# Patient Record
Sex: FEMALE
[De-identification: names, ages, dates, MRNs, and addresses within clinical notes are randomized per-mention and may not be internally consistent; named-entity substitution may affect disease eponyms.]

---

## 2024-02-12 ENCOUNTER — DOCUMENTATION (OUTPATIENT)
Dept: MEDICAL GROUP | Facility: CLINIC | Age: 84
End: 2024-02-12

## 2024-02-12 DIAGNOSIS — N90.7 LABIAL CYST: ICD-10-CM

## 2024-02-12 PROBLEM — I10 PRIMARY HYPERTENSION: Status: ACTIVE | Noted: 2024-02-12

## 2024-02-12 PROBLEM — G60.0 CHARCOT-MARIE DISEASE: Status: ACTIVE | Noted: 2024-02-12

## 2024-02-12 PROBLEM — E03.9 HYPOTHYROID: Status: ACTIVE | Noted: 2024-02-12

## 2024-02-12 PROBLEM — I82.90 BLOOD CLOT IN VEIN: Status: ACTIVE | Noted: 2024-02-12

## 2024-02-12 PROBLEM — Z87.42: Status: ACTIVE | Noted: 2024-02-12

## 2024-02-12 PROBLEM — K21.00 GASTROESOPHAGEAL REFLUX DISEASE WITH ESOPHAGITIS: Status: ACTIVE | Noted: 2024-02-12

## 2024-02-12 PROBLEM — K22.4 ESOPHAGEAL SPASM: Status: ACTIVE | Noted: 2024-02-12

## 2024-02-12 PROBLEM — N80.9 ENDOMETRIOSIS: Status: ACTIVE | Noted: 2024-02-12

## 2024-02-12 PROBLEM — E78.5 DYSLIPIDEMIA: Status: ACTIVE | Noted: 2024-02-12

## 2024-02-12 RX ORDER — LEVOTHYROXINE SODIUM 0.05 MG/1
50 TABLET ORAL
COMMUNITY

## 2024-02-12 RX ORDER — METOPROLOL TARTRATE 50 MG/1
50 TABLET, FILM COATED ORAL 2 TIMES DAILY
COMMUNITY

## 2024-02-12 RX ORDER — RABEPRAZOLE SODIUM 20 MG/1
20 TABLET, DELAYED RELEASE ORAL DAILY
COMMUNITY

## 2024-02-12 RX ORDER — CALCIPOTRIENE, BETAMETHASONE DIPROPIONATE 50; .643 UG/G; MG/G
0.5 OINTMENT TOPICAL DAILY
Qty: 60 G | Refills: 1 | Status: SHIPPED | OUTPATIENT
Start: 2024-02-12

## 2024-02-12 RX ORDER — UMECLIDINIUM BROMIDE AND VILANTEROL TRIFENATATE 62.5; 25 UG/1; UG/1
1 POWDER RESPIRATORY (INHALATION) DAILY
COMMUNITY

## 2024-02-12 RX ORDER — BACLOFEN 10 MG/1
10 TABLET ORAL 3 TIMES DAILY
COMMUNITY

## 2024-02-12 RX ORDER — LOSARTAN POTASSIUM AND HYDROCHLOROTHIAZIDE 25; 100 MG/1; MG/1
1 TABLET ORAL DAILY
COMMUNITY

## 2024-02-12 RX ORDER — FENOFIBRATE 48 MG/1
48 TABLET, COATED ORAL DAILY
COMMUNITY

## 2024-02-12 RX ORDER — ALIROCUMAB 150 MG/ML
INJECTION, SOLUTION SUBCUTANEOUS
COMMUNITY

## 2024-02-12 RX ORDER — CLOTRIMAZOLE AND BETAMETHASONE DIPROPIONATE 10; .64 MG/G; MG/G
1 CREAM TOPICAL 2 TIMES DAILY
Qty: 30 G | Refills: 1 | Status: SHIPPED | OUTPATIENT
Start: 2024-02-12

## 2024-02-12 NOTE — PROGRESS NOTES
CC: Vaginal lesions    HISTORY OF PRESENT ILLNESS: Patient is a 83 y.o. female patient with PMH significant for visual hallucinations, Charcot Tashia tooth, and MVP, HDL and hypertension who presents today for the following:    Vaginal lesions  Patient has been having vaginal lesions since 1959. Lesions have been increasing in frequency over the last two years, approximately every one and a half months. Lesion are described as painful as they begin to swell and then rupture on her labia majora. After they rupture they are no longer painful, process take about a week.  Patient reports using her 's taclonex ointment.  Patient also reports recent history of small hard lesions on her labia minora which are not painful.  Denies any lesions inside the vagina.  Denies any discharge.  Denies shaving pubic hair.    Patient reports she had blood work after her knee replacement and was told she had a virus but she never followed up with it.  PCP has tested her for herpes which was negative.  Patient reports being in a monogamous relationship with her  which is the only sexual partner she has ever had.  History of hysterectomy.   does not have similar lesions.    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    Current Outpatient Medications   Medication Sig Dispense Refill    levothyroxine (SYNTHROID) 50 MCG Tab Take 50 mcg by mouth every morning on an empty stomach.      rabeprazole (ACIPHEX) 20 MG tablet Take 20 mg by mouth every day.      fenofibrate (TRICOR) 48 MG Tab Take 48 mg by mouth every day.      Alirocumab  (PRALUENT) 150 MG/ML Solution Auto-injector Inject  under the skin.      metoprolol tartrate (LOPRESSOR) 50 MG Tab Take 50 mg by mouth 2 times a day.      losartan-hydrochlorothiazide (HYZAAR) 100-25 MG per tablet Take 1 Tablet by mouth every day.      baclofen (LIORESAL) 10 MG Tab Take 10 mg by mouth 3 times a day.      umeclidinium-vilanterol (ANORO ELLIPTA) 62.5-25 MCG/ACT AEROSOL POWDER, BREATH ACTIVATED inhaler Inhale 1 Puff every day.      ipratropium (ATROVENT) 0.02 % Solution Take 0.2 mg by nebulization.      clotrimazole-betamethasone (LOTRISONE) 1-0.05 % Cream Apply 1 Application topically 2 times a day. 30 g 1     No current facility-administered medications for this visit.        Social History     Social History Narrative    Not on file     No family history on file.    Exam:    Wt. 150, Ht. 67 in, /72, HR 86, SpO1 97%    General:  Well nourished, well developed female in NAD  HENT: Atraumatic, normocephalic  EYES: Extraocular movements intact, pupils equal and reactive to light  NECK: Supple, FROM  CHEST: No deformities, Equal chest expansion  RESP: Unlabored, no stridor or audible wheeze  ABD: Non-Distended  : On right labia majora 0.5 x 0.25 cm cystic lesion with hard mobile mass with surrounding area of fluctuance no pain to palpation and no drainage or discharge, multiple white hard nonmobile masses on labia minora measuring approximately 5 mm, no pain with palpation, drainage or discharge.  Extremities: No Clubbing, Cyanosis, or Edema  Skin: Warm/dry, without rashes  Neuro: A/O x 4, CN 2-12 Grossly intact, Motor/sensory grossly intact  Psych: Normal behavior, normal affect    LABS: Results reviewed and discussed with the patient, questions answered.    ASSESSMENT/PLAN:    Labial Cyst  60+ year history, increasing in frequency now occurring every month and a half and lasting for about a week.  Painful until it ruptures.  Appears to involve the follicle.  Taclonex ointment helped relieve  symptoms and prevent rupture.  Patient in monogamous relationship with only 1 sexual partner, low risk for STI.  Herpes test negative.  Labial cyst appears to be sebaceous in nature.    Plan  - Will order Taclonex ointment to apply at first sign of labial cyst    Labial Mass  Recent history of multiple hard masses on labia minora.  Not painful and without discharge.    Plan  - Unclear as to the cause, recommended biopsy for evaluation of lesion.  Given patient's sexual history less likely STI or HPV.    - Patient will return in 1 month for biopsy    Follow up in 1 month  Jossy Rivera MD PGY2

## 2024-03-11 ENCOUNTER — DOCUMENTATION (OUTPATIENT)
Dept: MEDICAL GROUP | Facility: CLINIC | Age: 84
End: 2024-03-11

## 2024-03-11 ENCOUNTER — HOSPITAL ENCOUNTER (OUTPATIENT)
Facility: MEDICAL CENTER | Age: 84
End: 2024-03-11

## 2024-03-11 DIAGNOSIS — Z87.42 HISTORY OF VAGINAL SORES: ICD-10-CM

## 2024-03-11 DIAGNOSIS — H00.15 CHALAZION LEFT LOWER EYELID: ICD-10-CM

## 2024-03-11 PROCEDURE — 88305 TISSUE EXAM BY PATHOLOGIST: CPT

## 2024-03-11 PROCEDURE — 88342 IMHCHEM/IMCYTCHM 1ST ANTB: CPT

## 2024-03-11 NOTE — ASSESSMENT & PLAN NOTE
Physical exam finding consistent with chalazion vs hordeolum. Recommend patient try a warm compress 3-4 times daily. If it does not resolve, patient will return to clinic.

## 2024-03-11 NOTE — PROGRESS NOTES
Subjective:     CC: Vaginal lesion biopsy    HPI:   Mansi with pmhx visual hallucinations, Charcot Tashia tooth, HDL, and HTN  who presents today with:    Problem   Chalazion Left Lower Eyelid    Patient has had a bump on the outer corner of her left eye for about a week. It has not affected her vision. The patient denies any itchiness, redness, or discharge from the area. She has not had similar lesions in the past.     History of Vaginal Sores    Patient has been having intermittent vaginal lesions for several years which are painful then rupture. They are located on her labia majora.         Current Outpatient Medications Ordered in Epic   Medication Sig Dispense Refill    levothyroxine (SYNTHROID) 50 MCG Tab Take 50 mcg by mouth every morning on an empty stomach.      rabeprazole (ACIPHEX) 20 MG tablet Take 20 mg by mouth every day.      fenofibrate (TRICOR) 48 MG Tab Take 48 mg by mouth every day.      Alirocumab (PRALUENT) 150 MG/ML Solution Auto-injector Inject  under the skin.      metoprolol tartrate (LOPRESSOR) 50 MG Tab Take 50 mg by mouth 2 times a day.      losartan-hydrochlorothiazide (HYZAAR) 100-25 MG per tablet Take 1 Tablet by mouth every day.      baclofen (LIORESAL) 10 MG Tab Take 10 mg by mouth 3 times a day.      umeclidinium-vilanterol (ANORO ELLIPTA) 62.5-25 MCG/ACT AEROSOL POWDER, BREATH ACTIVATED inhaler Inhale 1 Puff every day.      ipratropium (ATROVENT) 0.02 % Solution Take 0.2 mg by nebulization.      clotrimazole-betamethasone (LOTRISONE) 1-0.05 % Cream Apply 1 Application topically 2 times a day. 30 g 1    calcipotriene-betamethasone (TACLONEX) ointment Apply 0.5 Inches topically every day. 60 g 1     No current [facility]-ordered facility-administered medications on file.       ROS:  ROS as per HPI. Otherwise negative.      Objective:     Exam:  Wt 155lbs, Height 67.5 inches, /75, 96% O2    Gen: Alert and oriented, No apparent distress.  HEENT: Normal TM's. Mucous membranes moist and  clear. Neck is supple without lymphadenopathy.  Lungs: Normal effort, CTA bilaterally, no wheezes, rhonchi, or rales  CV: Regular rate and rhythm. No murmurs, rubs, or gallops.  Ext: No clubbing, cyanosis, edema.  Abdomen: No abdominal tenderness. No visible or palpable masses. No rebound or guarding.  : Multiple, firm 3-5mm lesions with that are slightly hyperpigmented with irregular boarders along labia majora.    Assessment & Plan:     83 y.o. female with the following -     Problem List Items Addressed This Visit       History of vaginal sores     Punch biopsy completed today. Will inform patient of results at follow-up visit.         Relevant Orders    Pathology Specimen    Chalazion left lower eyelid     Physical exam finding consistent with chalazion vs hordeolum. Recommend patient try a warm compress 3-4 times daily. If it does not resolve, patient will return to clinic.

## 2024-03-11 NOTE — PROGRESS NOTES
PRE-OP DIAGNOSIS: Labial lesions  POST-OP DIAGNOSIS: unknown  PROCEDURE: skin biopsy  Performing Physician: Florencia Venegas M.D.  Supervising Physician (if applicable): Helen Myers,     PROCEDURE:   _  Shave Biopsy        _  Excisional Biopsy        x  Punch (Size 3mm)    The area surrounding the skin lesion was prepared and draped in the  usual sterile manner. The lesion was removed in the usual manner by the  biopsy method noted above. Hemostasis was assured. The patient tolerated  the procedure well.    Closure:     x Drysol for hemostasis              _  suture _                   _  None    Followup: The patient tolerated the procedure well without  complications.  Standard post-procedure care is explained and return  precautions are given.

## 2024-03-12 LAB
AMBIGUOUS DTTM AMBI4: NORMAL
PATHOLOGY CONSULT NOTE: NORMAL

## 2024-04-10 ENCOUNTER — DOCUMENTATION (OUTPATIENT)
Dept: MEDICAL GROUP | Facility: CLINIC | Age: 84
End: 2024-04-10

## 2024-04-10 DIAGNOSIS — L72.0 MILIAL CYST: ICD-10-CM

## 2024-04-10 DIAGNOSIS — Z87.42 HISTORY OF VAGINAL SORES: ICD-10-CM

## 2024-04-10 RX ORDER — ACYCLOVIR 400 MG/1
400 TABLET ORAL DAILY
Qty: 90 TABLET | Refills: 3 | Status: SHIPPED | OUTPATIENT
Start: 2024-04-10

## 2024-04-10 NOTE — PROGRESS NOTES
Mansi returns today to follow-up her labial biopsy performed last month.  She had multiple asymptomatic lumps around her labia and one punch biopsy was performed with pathology suggestive of milia.      She has also complained of a different type of vaginal lesion which she gets intermittently for many years, she said they come on and form a bump which then burst clear liquid and is painful.  She said they usually resolve within about a week.  She has no diagnosis of herpes, and claims that one of her previous providers did a culture of this lesion and told her that it was not herpes.  She has never had a blood test.  She is not taking any antivirals.  She has not had 1 of these lesions at any of the appointments that I have seen her at    She is sexually active with 1 partner for 65 years.    Blood pressure 122/79 temperature 97.2 pulse 88 O2 saturation 97% respiratory rate 18 height 64 inches weight 196 pounds    Assessment and plan  Problem #1 vaginal milia -I explained to the patient that this is not an uncommon condition in older females that it tends to be asymptomatic.  That they can be treated with surgical removal although she declines this option.  There is the potential that benefit can be gained by using diluted tea tree oil or diluted witch hazel, also some suggestion that omega supplementation can be beneficial.  I told her that there is a good chance that none of these therapies would work and that if she decides she would like to have them surgically removed we could assist with that and that it is a very minor procedure.  She declines at this time.  Problem 2 intermittent vaginal ulcers -this certainly sounds consistent with herpes infection.  She claims it has been cultured in the past, I am assuming that if she was told it was not herpes that inappropriate by viral culture was performed.  At this point we discussed suppressive acyclovir, and also obtaining a serum HSV.  She is amenable.